# Patient Record
Sex: FEMALE | Race: WHITE | HISPANIC OR LATINO | Employment: FULL TIME | URBAN - METROPOLITAN AREA
[De-identification: names, ages, dates, MRNs, and addresses within clinical notes are randomized per-mention and may not be internally consistent; named-entity substitution may affect disease eponyms.]

---

## 2017-09-08 ENCOUNTER — TELEPHONE (OUTPATIENT)
Dept: GASTROENTEROLOGY | Facility: CLINIC | Age: 42
End: 2017-09-08

## 2017-09-27 ENCOUNTER — TELEPHONE (OUTPATIENT)
Dept: GASTROENTEROLOGY | Facility: CLINIC | Age: 42
End: 2017-09-27

## 2017-09-27 NOTE — TELEPHONE ENCOUNTER
Reminder call placed regarding 5 year follow up colonoscopy recommended by Dr. Burden.     Jaycee Escamilla RN

## 2017-09-27 NOTE — TELEPHONE ENCOUNTER
Per recommendations, patient is due for 5 year follow up colonoscopy     Repeat colonoscopy in 5 years for surveillance to                             evaluate for interval polyposis, particularly if                             there is a strong concern for Jimenes syndrome in                             light of patient's rectus muscle fibromatosis.    Please place order for scheduling    Jaycee Escamilla RN

## 2018-08-06 ENCOUNTER — RADIANT APPOINTMENT (OUTPATIENT)
Dept: MAMMOGRAPHY | Facility: CLINIC | Age: 43
End: 2018-08-06
Attending: OBSTETRICS & GYNECOLOGY
Payer: COMMERCIAL

## 2018-08-06 DIAGNOSIS — Z12.31 VISIT FOR SCREENING MAMMOGRAM: ICD-10-CM

## 2018-11-19 ENCOUNTER — RADIANT APPOINTMENT (OUTPATIENT)
Dept: GENERAL RADIOLOGY | Facility: CLINIC | Age: 43
End: 2018-11-19
Attending: FAMILY MEDICINE
Payer: COMMERCIAL

## 2018-11-19 ENCOUNTER — OFFICE VISIT (OUTPATIENT)
Dept: ORTHOPEDICS | Facility: CLINIC | Age: 43
End: 2018-11-19
Payer: COMMERCIAL

## 2018-11-19 VITALS
DIASTOLIC BLOOD PRESSURE: 79 MMHG | SYSTOLIC BLOOD PRESSURE: 119 MMHG | HEIGHT: 62 IN | HEART RATE: 80 BPM | BODY MASS INDEX: 26.68 KG/M2 | WEIGHT: 145 LBS

## 2018-11-19 DIAGNOSIS — M79.642 LEFT HAND PAIN: ICD-10-CM

## 2018-11-19 DIAGNOSIS — M79.642 LEFT HAND PAIN: Primary | ICD-10-CM

## 2018-11-19 NOTE — PATIENT INSTRUCTIONS
Dionte Ortega MD  [facility]  [facility]  [address] [place] [address]    Phone: [phone]  Fax: [phone]

## 2018-11-19 NOTE — MR AVS SNAPSHOT
"              After Visit Summary   11/19/2018    Erum Díaz    MRN: [id number]           Patient Information     Date Of Birth          1975        Visit Information        Provider Department      11/19/2018 4:50 PM Dionte Ortega MD [facility]        Today's Diagnoses     Left hand pain    -  1      Care Instructions    Dionte Ortega MD  [facility] Clinic  [facility]  [address] [place] [address]    Phone: [phone]  Fax: [phone]                   Follow-ups after your visit        Your next 10 appointments already scheduled     Dec 05, 2018  4:20 PM CST   (Arrive by 4:05 PM)   New Patient Visit with Zee Basilio MD   [facility] ([facility])    [address] [place] [address]  [address] [facility] [address] [phone]              Who to contact     Please call your clinic at [phone] to:    Ask questions about your health    Make or cancel appointments    Discuss your medicines    Learn about your test results    Speak to your doctor            Additional Information About Your Visit        Care EveryWhere ID     This is your Care EveryWhere ID. This could be used by other organizations to access your [place] medical records  [id number]        Your Vitals Were     Pulse Height BMI (Body Mass Index)             80 1.575 m (5' 2\") 26.52 kg/m2          Blood Pressure from Last 3 Encounters:   11/19/18 119/79   03/01/16 122/70   09/09/15 109/83    Weight from Last 3 Encounters:   11/19/18 65.8 kg (145 lb)   03/01/16 66.1 kg (145 lb 11.6 oz)   09/09/15 68 kg (150 lb)               Primary Care Provider Office Phone # Fax #    Bridgette Walker [phone]       [address] [address]        Equal Access to Services     YNES SALAZAR AH: Hadii aj Bartlett, waaxda luqadaha, qaybta " agus vargas josephmarly ronel mueller ah. So [facility] [phone].    ATENCIÓN: Si channing arias, tiene a thompson disposición servicios gratuitos de asistencia lingüística. Harshil al [phone].    We comply with applicable federal civil rights laws and [place] laws. We do not discriminate on the basis of race, color, national origin, age, disability, sex, sexual orientation, or gender identity.            Thank you!     Thank you for choosing [facility]  for your care. Our goal is always to provide you with excellent care. Hearing back from our patients is one way we can continue to improve our services. Please take a few minutes to complete the written survey that you may receive in the mail after your visit with us. Thank you!             Your Updated Medication List - Protect others around you: Learn how to safely use, store and throw away your medicines at www.disposemymeds.org.          This list is accurate as of 11/19/18  6:21 PM.  Always use your most recent med list.                   Brand Name Dispense Instructions for use Diagnosis    ALBUTEROL SULFATE HFA IN      Inhale  into the lungs as needed.        calcium + D 600-200 MG-UNIT Tabs   Generic drug:  calcium carbonate-vitamin D      Take 2 tablets by mouth daily.        CIPROFLOXACIN PO      Take 500 mg by mouth 2 times daily        FEXOFENADINE HCL PO      Take  by mouth as needed.        ibuprofen 200 MG tablet    ADVIL/MOTRIN     Take 200 mg by mouth every 4 hours as needed. Takes 2 tablets PRN        MULTI FOR HER Tabs      Take 1 tablet by mouth daily.        NASONEX 50 MCG/ACT spray   Generic drug:  mometasone      Spray 2 sprays into both nostrils daily.        oxyCODONE-acetaminophen 5-325 MG per tablet    PERCOCET    10 tablet    Take 1-2 tablets by mouth every 4 hours as needed for pain (moderate to severe)    Uterine cramping       PROZAC PO           tolterodine 4 MG 24 hr capsule     DETROL LA    31 capsule    Take 1 capsule (4 mg) by mouth daily    Urinary urgency, Urge incontinence

## 2018-11-20 LAB — RADIOLOGIST FLAGS: NORMAL

## 2018-11-24 ENCOUNTER — DOCUMENTATION ONLY (OUTPATIENT)
Dept: ORTHOPEDICS | Facility: CLINIC | Age: 43
End: 2018-11-24

## 2018-11-24 NOTE — PROGRESS NOTES
Erum was fitted for a left thumb spica exos brace. Brace was fitted properly and patient verbalized that exos fit well. Patient was taught proper care and use of the brace. All her questions were answered. She verbalized understanding of plan.  Teresa Rasheed ATC

## 2018-12-03 ENCOUNTER — HEALTH MAINTENANCE LETTER (OUTPATIENT)
Age: 43
End: 2018-12-03

## 2018-12-03 NOTE — TELEPHONE ENCOUNTER
FUTURE VISIT INFORMATION      FUTURE VISIT INFORMATION:    Date: 12/5/18    Time:     [place] [facility] [place] INFORMATION:    Referring provider:  self    Referring providers clinic:      Reason for visit/diagnosis  L Hip and Back Pain. Previous patient of Dr. Basilio. Last visit 3/18/2015.  2 week f/u Palm of left hand, slipped and fell on wet floor. 11/10/2018. Was seen in [facility] on 11/19/18.  Appointment per Patient.    RECORDS REQUESTED FROM:       Clinic name Comments Records Status Imaging Status     internal internal

## 2018-12-05 ENCOUNTER — PRE VISIT (OUTPATIENT)
Dept: ORTHOPEDICS | Facility: CLINIC | Age: 43
End: 2018-12-05

## 2018-12-05 ENCOUNTER — RADIANT APPOINTMENT (OUTPATIENT)
Dept: GENERAL RADIOLOGY | Facility: CLINIC | Age: 43
End: 2018-12-05
Payer: COMMERCIAL

## 2018-12-05 ENCOUNTER — OFFICE VISIT (OUTPATIENT)
Dept: ORTHOPEDICS | Facility: CLINIC | Age: 43
End: 2018-12-05
Payer: COMMERCIAL

## 2018-12-05 VITALS — RESPIRATION RATE: 16 BRPM | HEIGHT: 62 IN | BODY MASS INDEX: 26.68 KG/M2 | WEIGHT: 145 LBS

## 2018-12-05 DIAGNOSIS — M25.532 LEFT WRIST PAIN: Primary | ICD-10-CM

## 2018-12-05 DIAGNOSIS — M25.532 LEFT WRIST PAIN: ICD-10-CM

## 2018-12-05 DIAGNOSIS — S73.192D TEAR OF LEFT ACETABULAR LABRUM, SUBSEQUENT ENCOUNTER: ICD-10-CM

## 2018-12-05 NOTE — MR AVS SNAPSHOT
After Visit Summary   12/5/2018    Erum Díaz    MRN: [id number]           Patient Information     Date Of Birth          1975        Visit Information        Provider Department      12/5/2018 4:20 PM Zee Basilio MD [facility] Medicine        Today's Diagnoses     Left wrist pain    -  1    Tear of left acetabular labrum, subsequent encounter           Follow-ups after your visit        Additional Services     HAND THERAPY Occupational Therapy or Physical Therapy       Hand Therapy Referral: initial scaphoid tenderness, exos for 3 weeks            PHYSICAL THERAPY REFERRAL (Internal)       Physical Therapy Referral                  Follow-up notes from your care team     Return in about 6 weeks (around 1/16/2019), or if symptoms worsen or fail to improve.      Your next 10 appointments already scheduled     Dec 07, 2018  8:30 AM CST   (Arrive by 8:15 AM)   CATHY Hand with Yvonne Hall OT   [facility] ([facility])    [address] [place] [address]   [phone]            Dec 14, 2018  8:30 AM CST   CATHY Hand with Judi Barfield OT   [facility] ([facility])    [address] [place] [address]   [phone]            Dec 21, 2018  8:30 AM CST   CATHY Hand with Kylie Aldana OT   [facility] ([facility])    [address] [place] [address]   [phone]            Dec 28, 2018  8:30 AM CST   CATHY Hand with Judi Barfield OT   [facility] ([facility])    [address] [place] [address]   [phone]            Jan 04, 2019  8:30 AM CST   CATHY Hand with Kylie Aldana OT   [facility] ([facility])    [address] [place] [address] [phone]            Jan  "04, 2019  9:00 AM CST   (Arrive by 8:45 AM)   CATHY Extremity with Lawson Connell PT   [facility] CATHY ([facility] [place])    [address] [address] [phone]              Future tests that were ordered for you today     Open Future Orders        Priority Expected Expires Ordered    PHYSICAL THERAPY REFERRAL (Internal) Routine  12/5/2019 12/5/2018    HAND THERAPY Occupational Therapy or Physical Therapy Routine  12/5/2019 12/5/2018            Who to contact     Please call your clinic at [phone] to:    Ask questions about your health    Make or cancel appointments    Discuss your medicines    Learn about your test results    Speak to your doctor            Additional Information About Your Visit        [employer] Information     [employer] gives you secure access to your electronic health record. If you see a primary care provider, you can also send messages to your care team and make appointments. If you have questions, please call your primary care clinic.  If you do not have a primary care provider, please call [phone] and they will assist you.      [employer] is an electronic gateway that provides easy, online access to your medical records. With [employer], you can request a clinic appointment, read your test results, renew a prescription or communicate with your care team.     To access your existing account, please contact your [facility] or call [phone] for assistance.        Care EveryWhere ID     This is your Care EveryWhere ID. This could be used by other organizations to access your [place] medical records  [id number]        Your Vitals Were     Respirations Height BMI (Body Mass Index)             16 5' 2\" (1.575 m) 26.52 kg/m2          Blood Pressure from Last 3 Encounters:   11/19/18 119/79   03/01/16 122/70   09/09/15 109/83    Weight from Last 3 Encounters:   12/05/18 145 lb (65.8 " kg)   11/19/18 145 lb (65.8 kg)   03/01/16 145 lb 11.6 oz (66.1 kg)               Primary Care Provider Office Phone # Fax #    Bridgette Walker [phone]       [address] [address]        Equal Access to Services     YNES SALAZAR : Hadii aad ku hadasho Soomaali, waaxda luqadaha, qaybta kaalmada adeegyada, agus singletaryin hayerumn ronel nuñezsophyrobb mueller . So [facility] [phone].    ATENCIÓN: Si habla español, tiene a thompson disposición servicios gratuitos de asistencia lingüística. Linaame al [phone].    We comply with applicable federal civil rights laws and [place] laws. We do not discriminate on the basis of race, color, national origin, age, disability, sex, sexual orientation, or gender identity.            Thank you!     Thank you for choosing [facility]  for your care. Our goal is always to provide you with excellent care. Hearing back from our patients is one way we can continue to improve our services. Please take a few minutes to complete the written survey that you may receive in the mail after your visit with us. Thank you!             Your Updated Medication List - Protect others around you: Learn how to safely use, store and throw away your medicines at www.disposemymeds.org.          This list is accurate as of 12/5/18 11:59 PM.  Always use your most recent med list.                   Brand Name Dispense Instructions for use Diagnosis    ALBUTEROL SULFATE HFA IN      Inhale  into the lungs as needed.        calcium + D 600-200 MG-UNIT Tabs   Generic drug:  calcium carbonate-vitamin D      Take 2 tablets by mouth daily.        CIPROFLOXACIN PO      Take 500 mg by mouth 2 times daily        FEXOFENADINE HCL PO      Take  by mouth as needed.        ibuprofen 200 MG tablet    ADVIL/MOTRIN     Take 200 mg by mouth every 4 hours as needed. Takes 2 tablets PRN        MULTI FOR HER Tabs      Take 1 tablet by mouth daily.        NASONEX 50  MCG/ACT nasal spray   Generic drug:  mometasone      Spray 2 sprays into both nostrils daily.        oxyCODONE-acetaminophen 5-325 MG tablet    PERCOCET    10 tablet    Take 1-2 tablets by mouth every 4 hours as needed for pain (moderate to severe)    Uterine cramping       PROZAC PO           tolterodine ER 4 MG 24 hr capsule    DETROL LA    31 capsule    Take 1 capsule (4 mg) by mouth daily    Urinary urgency, Urge incontinence

## 2018-12-05 NOTE — LETTER
December 5, 2018    RE:Erum Díaz  [address] [facility] [facility] [address]    1975            Dear Ms. Díaz      To Whom It May Concern:    Erum Díaz is under my professional care for    Left wrist pain  Tear of left acetabular labrum, subsequent encounter.   She  may return to work on or about 12/6/18 with the following: Please allow patient to attend hand therapy and physical therapy.  She will require time to attend these appointments.        Sincerely,      Zee Basilio MD

## 2018-12-05 NOTE — PROGRESS NOTES
" Subjective:   Erum Díaz is a 43 year old female who presents with left wrist pain. She states that she had a FOOSH injury. She also wants to f/u with her labral tear in her left hip. 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 She states that it is painful to take off exos.    Left hip intermittent pain at hip flexors and glut muscles. Gradually worsening over past month. Asymptomatic today. Hasn't been doing PT exercises or stretches recently, but usually reports improvement in sx when she does these. Previous right hip MRI demonstrated labral tear, but denies any groin pain or mechanical sx.     Left Wrist improving gradually. Has been wearing exos except for few breaks. Wrist motion improving, only discomfort now with opposition. No swelling.     Background:   Date of injury: 11/10/18  Duration of symptoms: 3-4 weeks  Mechanism of Injury: Acute; Fall  Aggravating factors: When she was working at her job  Relieving Factors: Immobilization   Prior Evaluation: Prior Physician Evalutation: Dr. Ortega, xrays    PAST MEDICAL, SOCIAL, SURGICAL AND FAMILY HISTORY: Past medical, surgical, family and social history was reviewed and unchanged since last visit.  ALLERGIES: She is allergic to hydrocodone and minocycline.    CURRENT MEDICATIONS: She has a current medication list which includes the following prescription(s): albuterol sulfate, calcium + d, ciprofloxacin, fexofenadine hcl, fluoxetine hcl, ibuprofen, mometasone, multi for her, oxycodone-acetaminophen, and tolterodine er.     REVIEW OF SYSTEMS: 10 point review of systems is negative except as noted above.     Exam:   Resp 16  Ht 5' 2\" (1.575 m)  Wt 145 lb (65.8 kg)  BMI 26.52 kg/m2           CONSTITUTIONAL: healthy, alert and no distress  HEAD: Normocephalic. No masses, lesions, tenderness or abnormalities  SKIN: no suspicious lesions or rashes  GAIT: " normal  NEUROLOGIC: Non-focal  PSYCHIATRIC: affect normal/bright and mentation appears normal.    MUSCULOSKELETAL:   Left hip  Palpation: Tender:   left gluteus medius  Non-tender:  right greater trochanter, right gluteus medius, right ASIS  Range of Motion:  Full ROM, both hips. No groin pain  Strength:  full strength  Special tests: adequate hip abductor strength    Left wrist  No obvious swelling or deformity  Tender to palpation over thenar area and scaphoid tubercle  Nontender over anatomic snuffbox, distal radius, ulnar styloid  No pain with passive wrist extension, flexion, ulnar/radial deviation, or supination/pronation  Mild pain with resisted flexion.  Thumb opposition and  strength 5/5       Assessment/Plan:   Left wrist pain: Repeat wrist xrays with scaphoid view today reassuring, no fracture or osseous changes visualized now 3 weeks after injury. Recommend wearing brace for comfort with daily activities, and begin working on range of motion with hand therapy. Follow-up one month if not improving, sooner as needed.   -     HAND THERAPY Occupational Therapy or Physical Therapy; Future    Tear of left acetabular labrum, subsequent encounter: Known labral tear, symptoms intermittent and improved previously with formal physical therapy. We discussed the option of repeating imaging, but would be unlikely to . Patient agreeable to returning to physical therapy. Follow up as needed if not improving. Consider US guided hip injection  -     PHYSICAL THERAPY REFERRAL (Internal); Future    RTC 6 weeks  X-RAY INTERPRETATION:   X-Ray of the Left Wrist: 3-view, ap, lateral, oblique  ordered and interpreted in the office today was negative for fracture, subluxation or joint space abnormality.

## 2018-12-05 NOTE — LETTER
"  12/5/2018      RE: Erum Díaz  [address] [facility] [address]        Subjective:   Erum Díaz is a 43 year old female who presents with left wrist pain. She states that she had a FOOSH injury. She also wants to f/u with her labral tear in her left hip. 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 She states that it is painful to take off exos.    Left hip intermittent pain at hip flexors and glut muscles. Gradually worsening over past month. Asymptomatic today. Hasn't been doing PT exercises or stretches recently, but usually reports improvement in sx when she does these. Previous right hip MRI demonstrated labral tear, but denies any groin pain or mechanical sx.     Left Wrist improving gradually. Has been wearing exos except for few breaks. Wrist motion improving, only discomfort now with opposition. No swelling.     Background:   Date of injury: 11/10/18  Duration of symptoms: 3-4 weeks  Mechanism of Injury: Acute; Fall  Aggravating factors: When she was working at her job  Relieving Factors: Immobilization   Prior Evaluation: Prior Physician Evalutation: Dr. Ortega, xrays    PAST MEDICAL, SOCIAL, SURGICAL AND FAMILY HISTORY: Past medical, surgical, family and social history was reviewed and unchanged since last visit.  ALLERGIES: She is allergic to hydrocodone and minocycline.    CURRENT MEDICATIONS: She has a current medication list which includes the following prescription(s): albuterol sulfate, calcium + d, ciprofloxacin, fexofenadine hcl, fluoxetine hcl, ibuprofen, mometasone, multi for her, oxycodone-acetaminophen, and tolterodine er.     REVIEW OF SYSTEMS: 10 point review of systems is negative except as noted above.     Exam:   Resp 16  Ht 5' 2\" (1.575 m)  Wt 145 lb (65.8 kg)  BMI 26.52 kg/m2           CONSTITUTIONAL: healthy, alert and no distress  HEAD: Normocephalic. No " masses, lesions, tenderness or abnormalities  SKIN: no suspicious lesions or rashes  GAIT: normal  NEUROLOGIC: Non-focal  PSYCHIATRIC: affect normal/bright and mentation appears normal.    MUSCULOSKELETAL:   Left hip  Palpation: Tender:   left gluteus medius  Non-tender:  right greater trochanter, right gluteus medius, right ASIS  Range of Motion:  Full ROM, both hips. No groin pain  Strength:  full strength  Special tests: adequate hip abductor strength    Left wrist  No obvious swelling or deformity  Tender to palpation over thenar area and scaphoid tubercle  Nontender over anatomic snuffbox, distal radius, ulnar styloid  No pain with passive wrist extension, flexion, ulnar/radial deviation, or supination/pronation  Mild pain with resisted flexion.  Thumb opposition and  strength 5/5       Assessment/Plan:   Left wrist pain: Repeat wrist xrays with scaphoid view today reassuring, no fracture or osseous changes visualized now 3 weeks after injury. Recommend wearing brace for comfort with daily activities, and begin working on range of motion with hand therapy. Follow-up one month if not improving, sooner as needed.   -     HAND THERAPY Occupational Therapy or Physical Therapy; Future    Tear of left acetabular labrum, subsequent encounter: Known labral tear, symptoms intermittent and improved previously with formal physical therapy. We discussed the option of repeating imaging, but would be unlikely to . Patient agreeable to returning to physical therapy. Follow up as needed if not improving. Consider US guided hip injection  -     PHYSICAL THERAPY REFERRAL (Internal); Future    RTC 6 weeks  X-RAY INTERPRETATION:   X-Ray of the Left Wrist: 3-view, ap, lateral, oblique  ordered and interpreted in the office today was negative for fracture, subluxation or joint space abnormality.    Zee Basilio MD

## 2018-12-07 ENCOUNTER — THERAPY VISIT (OUTPATIENT)
Dept: OCCUPATIONAL THERAPY | Facility: CLINIC | Age: 43
End: 2018-12-07
Payer: COMMERCIAL

## 2018-12-07 DIAGNOSIS — M25.532 LEFT WRIST PAIN: ICD-10-CM

## 2018-12-07 PROCEDURE — 97165 OT EVAL LOW COMPLEX 30 MIN: CPT | Mod: GO | Performed by: OCCUPATIONAL THERAPIST

## 2018-12-07 PROCEDURE — 97110 THERAPEUTIC EXERCISES: CPT | Mod: GO | Performed by: OCCUPATIONAL THERAPIST

## 2018-12-07 PROCEDURE — 97760 ORTHOTIC MGMT&TRAING 1ST ENC: CPT | Mod: GO | Performed by: OCCUPATIONAL THERAPIST

## 2018-12-07 NOTE — MR AVS SNAPSHOT
After Visit Summary   12/7/2018    Erum Díaz    MRN: [id number]           Patient Information     Date Of Birth          1975        Visit Information        Provider Department      12/7/2018 8:30 AM Yvonne Hall OT  [facility]        Today's Diagnoses     Left wrist pain           Follow-ups after your visit        Your next 10 appointments already scheduled     Dec 14, 2018  8:30 AM CST   CATHY Hand with Judi Barfield OT    [facility] ([facility])    [address] [place] [id number] [phone]            Dec 21, 2018  8:30 AM CST   CATHY Hand with Kylie Aldana OT    [facility] ([facility])    [address] [place] [address] [id number] [phone]            Dec 28, 2018  8:30 AM CST   CATHY Hand with Judi Barfield OT    [facility] ([facility])    [address] [place] [address] [id number] [phone]            Jan 04, 2019  8:30 AM CST   CATHY Hand with ASHLEY [facility] ([facility])    [address] [place] [address] [id number] [phone]            Jan 04, 2019  9:00 AM CST   (Arrive by 8:45 AM)   CATHY Extremity with Lawson Connell PT   [facility] CATHY ([facility])    [address] [place] [address] [id number] [phone]              Who to contact     If you have questions or need follow up information about today's clinic visit or your schedule please contact [facility] directly at [phone].  Normal or non-critical lab and imaging results will be communicated to you by MyChart, letter or phone within 4 business days after the clinic has received the results. If you do not hear from us within 7 days, please contact the clinic through [employer] or  phone. If you have a critical or abnormal lab result, we will notify you by phone as soon as possible.  Submit refill requests through [employer] or call your pharmacy and they will forward the refill request to us. Please allow 3 business days for your refill to be completed.          Additional Information About Your Visit        [employer] Information     [employer] gives you secure access to your electronic health record. If you see a primary care provider, you can also send messages to your care team and make appointments. If you have questions, please call your primary care clinic.  If you do not have a primary care provider, please call [phone] and they will assist you.        Care EveryWhere ID     This is your Care EveryWhere ID. This could be used by other organizations to access your [place] medical records  [id number]         Blood Pressure from Last 3 Encounters:   11/19/18 119/79   03/01/16 122/70   09/09/15 109/83    Weight from Last 3 Encounters:   12/05/18 65.8 kg (145 lb)   11/19/18 65.8 kg (145 lb)   03/01/16 66.1 kg (145 lb 11.6 oz)              We Performed the Following     HAND THERAPY Occupational Therapy or Physical Therapy     HC OT EVAL, LOW COMPLEXITY     CATHY INITIAL EVAL REPORT     ORTHOTIC MGMT AND TRAINING, EACH 15 MIN     THERAPEUTIC EXERCISES        Primary Care Provider Office Phone # Fax #    Bridgette Walker [phone]       [place] [address]        Equal Access to Services     YNES SALAZAR AH: Hadii aad ku hadasho Soomaali, waaxda luqadaha, qaybta kaalmada adeegyada, agus dean. So [facility] [phone].    ATENCIÓN: Si habla español, tiene a thompson disposición servicios gratuitos de asistencia lingüística. Llame al [phone].    We comply with applicable federal civil rights laws and [place] laws. We do not discriminate on the basis of race, color, national origin, age, disability, sex, sexual  orientation, or gender identity.            Thank you!     Thank you for choosing [facility]  for your care. Our goal is always to provide you with excellent care. Hearing back from our patients is one way we can continue to improve our services. Please take a few minutes to complete the written survey that you may receive in the mail after your visit with us. Thank you!             Your Updated Medication List - Protect others around you: Learn how to safely use, store and throw away your medicines at www.disposemymeds.org.          This list is accurate as of 12/7/18 10:01 AM.  Always use your most recent med list.                   Brand Name Dispense Instructions for use Diagnosis    ALBUTEROL SULFATE HFA IN      Inhale  into the lungs as needed.        calcium + D 600-200 MG-UNIT Tabs   Generic drug:  calcium carbonate-vitamin D      Take 2 tablets by mouth daily.        CIPROFLOXACIN PO      Take 500 mg by mouth 2 times daily        FEXOFENADINE HCL PO      Take  by mouth as needed.        ibuprofen 200 MG tablet    ADVIL/MOTRIN     Take 200 mg by mouth every 4 hours as needed. Takes 2 tablets PRN        MULTI FOR HER Tabs      Take 1 tablet by mouth daily.        NASONEX 50 MCG/ACT nasal spray   Generic drug:  mometasone      Spray 2 sprays into both nostrils daily.        oxyCODONE-acetaminophen 5-325 MG tablet    PERCOCET    10 tablet    Take 1-2 tablets by mouth every 4 hours as needed for pain (moderate to severe)    Uterine cramping       PROZAC PO           tolterodine ER 4 MG 24 hr capsule    DETROL LA    31 capsule    Take 1 capsule (4 mg) by mouth daily    Urinary urgency, Urge incontinence

## 2018-12-07 NOTE — PROGRESS NOTES
"Hand Therapy Initial Evaluation    Current Date:  12/7/2018    Diagnosis: L wrist pain  DOI: 11/10/18    Subjective:  Erum Díaz is a 43 year old right hand dominant female.    Patient reports symptoms of pain, stiffness/loss of motion and weakness/loss of strength of the left wrist which occurred due to FOOSH sideways while walking out of restaurant. Since onset symptoms are Unchanged  Special tests:  x-ray see note below.  Previous treatment: exos splint .    General health as reported by patient is excellent.  Pertinent medical history includes:Incontinence, Overweight  Medical allergies:none.  Surgical history: none.  Medication history: Pain, Steroids.    Per MD note, \"Repeat wrist xrays with scaphoid view today reassuring, no fracture or osseous changes visualized now 3 weeks after injury\"    Occupational Profile Information:  Current occupation is veterinary dermatologist - professor at the   Currently working in normal job without restrictions  Job Tasks: Computer Work, Driving, Lifting, Carrying, Repetitive Tasks  Prior functional level:  no limitations  Barriers include:none  Mobility: No difficulty  Transportation: drives  Leisure activities/hobbies: hiking, reading, family time    Functional Outcome Measure:  Upper Extremity Functional Index Score:  SCORE:   Column Totals: /80: 41   (A lower score indicates greater disability.)    Objective:  Sensation: WNL per patient report    Pain Level Report: On scale 0-10/10  Date 12/7/2018    Side L    Overall 5    At Rest 0-1    With Activity 7-8      Primary Report: location and description  Date 12/7/2018    Side L    Location Volar thumb, thenar eminence, snuffbox, 1st DC    Radiation no    Pain Quality Sharp, strong    Frequency intermittent    Duration Motion dependent    Exacerbated by  Lifting, gripping, twisting, pinching, pulling, opposition     Relieved by Rest, brace    Progression since onset unchanged      Range of Motion Wrist AROM (PROM):  Date " 12/7/2018 12/7/2018   Side R L   Ext 63 63   Flex 78 65   UD 28 25   RD 15 10   Sup 85 85   Pro 85 85     Thumb Range of Motion:  AROM (PROM)  Date 12/7/2018 12/7/2018   Side: R L   MCP ext 0 0   MCP flex 52 62   IP ext 0 0   IP flex 60 50   RABD 43 35   PABD 40 32   Opposition - Kapandji 10/10 6/10 (pain 5/10)     Tenderness: Pain level report on scale 0-10/10  Date 12/7/2018    Side L    Radial Styloid 2    DRSN 0    PIN 0      Special Tests: Pain level report on scale 0-10/10  Date 12/7/2018    Side L    Finkelstein 2    Tinels at DRSN 0      Resisted Testing: MMT on scale 0-5/5, Pain level report on scale 0-10/10  Date 12/7/2018    Side L    Thumb Abduction 0    Thumb Extension 3    Radial Deviation 2    Ulnar Deviation 0    Wrist Ext 0    Wrist Flex 0      STRENGTH: (Measured in pounds, pain scale 0-10/10)    Date 12/7/2018        Trials Left Right Left Right Left Right Left Right Left Right Left Right   1 5 35             2               3               Avg               Pain 1                3 Point Pinch  Date 12/7/2018        Trials Left Right Left Right Left Right Left Right Left Right Left Right   1 3 12             2               3               Avg               Pain 3                Lateral Pinch  Date 12/7/2018        Trials Left Right Left Right Left Right Left Right Left Right Left Right   1 8 13             2               3               Avg               Pain 2                Assessment:  Patient presents with symptoms consistent with diagnosis of left wrist pain, with conservative intervention.     Patient's limitations or Problem List includes:  Pain, Decreased ROM/motion, Weakness, Decreased  and pinch strength and tightness in musculature of the left wrist, hand and thumb which interferes with the patient's ability to perform Self Care Tasks (dressing, bathing, hygiene/toileting), Work Tasks, Recreational Activities and Household Chores as compared to previous level of  function.    Rehab Potential:  Excellent - Return to full activity, no limitations    Patient will benefit from skilled Occupational Therapy to increase wrist and thumb ROM, flexibility,  strength and pinch strength and decrease pain to return to previous activity level and resume normal daily tasks and to reach their rehab potential.    Barriers to Learning:  No barrier    Communication Issues:  Patient appears to be able to clearly communicate and understand verbal and written communication and follow directions correctly.    Chart Review: Brief history including review of medical and/or therapy records relating to the presenting problem and Simple history review with patient    Assessment of Occupational Performance:  5 or more Performance Deficits  Identified Performance Deficits: bathing/showering, feeding, hygiene and grooming, home establishment and management, meal preparation and cleanup, shopping, work and leisure activities      Clinical Decision Making (Complexity): Low complexity    Treatment Explanation:  The following has been discussed with the patient:    RX ordered/plan of care  Anticipated outcomes  Possible risks and side effects    P: Frequency:  1 X week, once daily  Duration:  for 8 weeks    Treatment Plan:    Modalities:    US and Fluidotherapy   Therapeutic Exercise:   AROM of wrist and thumb  PROM with stretch to wrist and thumb extensors   Wrist isotonics and isometrics as appropriate  Manual Techniques:   Re-alignment of the wrist in relation to radius (glide radially)  Interosseous membrane glide  Friction massage over 1st dorsal compartment  Myofascial release of the thumb extensors and flexors  Passive/Active radial nerve glides  Orthosis:    Forearm based thumb spica orthosis    Home Program:   Exercise:   AROM of wrist and thumb, PROM with stretch into simultaneous thumb flexion and ulnar deviation, pain free  Orthosis:   Static orthosis and Forearm based orthosis   Thumb spica  orthosis during the day and at night if needed. Will remove for exercise and hygiene.  Activity:   Avoid activities that exacerbate pain in the wrist or thumb.    Avoid  with twist, wide grasp.    Discharge Plan:    Achieve all LTG.  Independent in home treatment program.  Reach maximal therapeutic benefit.    Next Visit:  Check splint  MFR  US?  Radial nerve gliding?

## 2018-12-14 ENCOUNTER — THERAPY VISIT (OUTPATIENT)
Dept: OCCUPATIONAL THERAPY | Facility: CLINIC | Age: 43
End: 2018-12-14
Payer: COMMERCIAL

## 2018-12-14 DIAGNOSIS — M25.532 LEFT WRIST PAIN: ICD-10-CM

## 2018-12-14 PROCEDURE — 97112 NEUROMUSCULAR REEDUCATION: CPT | Mod: GO | Performed by: OCCUPATIONAL THERAPIST

## 2018-12-14 PROCEDURE — 97110 THERAPEUTIC EXERCISES: CPT | Mod: GO | Performed by: OCCUPATIONAL THERAPIST

## 2018-12-14 PROCEDURE — 97140 MANUAL THERAPY 1/> REGIONS: CPT | Mod: GO | Performed by: OCCUPATIONAL THERAPIST

## 2018-12-21 ENCOUNTER — THERAPY VISIT (OUTPATIENT)
Dept: OCCUPATIONAL THERAPY | Facility: CLINIC | Age: 43
End: 2018-12-21
Payer: COMMERCIAL

## 2018-12-21 DIAGNOSIS — M25.532 LEFT WRIST PAIN: ICD-10-CM

## 2018-12-21 PROCEDURE — 97763 ORTHC/PROSTC MGMT SBSQ ENC: CPT | Mod: GO | Performed by: OCCUPATIONAL THERAPIST

## 2019-01-04 ENCOUNTER — THERAPY VISIT (OUTPATIENT)
Dept: PHYSICAL THERAPY | Facility: CLINIC | Age: 44
End: 2019-01-04
Payer: COMMERCIAL

## 2019-01-04 ENCOUNTER — THERAPY VISIT (OUTPATIENT)
Dept: OCCUPATIONAL THERAPY | Facility: CLINIC | Age: 44
End: 2019-01-04
Payer: COMMERCIAL

## 2019-01-04 DIAGNOSIS — S73.192D TEAR OF LEFT ACETABULAR LABRUM, SUBSEQUENT ENCOUNTER: Primary | ICD-10-CM

## 2019-01-04 DIAGNOSIS — M25.532 LEFT WRIST PAIN: Primary | ICD-10-CM

## 2019-01-04 DIAGNOSIS — M25.551 HIP PAIN, RIGHT: ICD-10-CM

## 2019-01-04 DIAGNOSIS — M25.552 HIP PAIN, LEFT: ICD-10-CM

## 2019-01-04 PROCEDURE — 97161 PT EVAL LOW COMPLEX 20 MIN: CPT | Mod: GP | Performed by: PHYSICAL THERAPIST

## 2019-01-04 PROCEDURE — 97110 THERAPEUTIC EXERCISES: CPT | Mod: GP | Performed by: PHYSICAL THERAPIST

## 2019-01-04 PROCEDURE — 97112 NEUROMUSCULAR REEDUCATION: CPT | Mod: GO | Performed by: OCCUPATIONAL THERAPIST

## 2019-01-04 PROCEDURE — 97110 THERAPEUTIC EXERCISES: CPT | Mod: GO | Performed by: OCCUPATIONAL THERAPIST

## 2019-01-04 NOTE — PROGRESS NOTES
SOAP note objective information for 1/4/2019.  Please refer to the daily flowsheet for treatment today, total treatment time and time spent performing 1:1 timed codes.           1/4/2019      CMC exer FDI AROM and with RB at p1, 30/x 3x/day      Gripping Use isoflex hold 10 sec 3/day for 10 reps each        Next Progress CMC stability and wrist stability

## 2019-01-04 NOTE — PROGRESS NOTES
[facility] Initial Evaluation  Subjective:  HPI                  In 2015 the pt fell on her L knee during the Winter. A week or two later she was doing a lot of dancing and she felt a sharp pain in the hip. The felt pain in the front and back of her L hip. She had an X-ray and MRI which revealed a labral tear in the hip. She did PT at that point, but did not do too much follow up. She took NSAIDS and rested. Exercising with step classes seemed to help. Currently, longer walks and hiking seems to be worse than going up and down stairs. The elliptical machine does not hurt at all and seems to help, aerobics, and weight lifting are fine. She feels she is inconsistent with exercise and recently started back up again and noticed some pain in the R hip as well. Now, when she is sleeping she feels some pain in her hips. She typically sleeps on her stomach with her hips in ER/flexion in a frog leg position. The pain seems very similar on the R compared to the L. 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 Getting out of the resting position seems to help the position.     Objective:    Gait:      Deviations:  Hip:  Trendelenberg L               Lumbar/SI Evaluation  ROM:  AROM Lumbar: normal (No reproduction of hip pain with repeated movement testing)                                                          Hip Evaluation  HIP AROM:    Flexion: Left: 115    Right:  115          Internal Rotation: Left: 38    Right: 47  External Rotation: Left: 42    Right: 36      Hip PROM:  Hip PROM:  Left Hip:    Normal  Right Hip:  Normal                          Hip Strength:    Flexion:   Left: 5/5   Pain:  Right: 5/5   Pain:                    Extension:  Left: 4/5  Pain:Right: 4/5    Pain:    Abduction:  Left: 4/5     Pain:Right: 4/5    Pain:                  Hip Special Testing:    Left hip positive for the following special tests:  Raghav;  Fadir/Labrum and Gabriel   Right hip positive for the following special tests:  ThomasRight hip negative for the following special tests:  Raghav or Fadir/Labrum    Hip Palpation:    Left hip tenderness present at:   Greater Trachanter    Right hip tenderness not present at:  Greater Trachanter  Functional Testing:  Functional test hip: No pain with DL squat.                       General     ROS    Assessment/Plan:    Patient is a 43 year old female with both sides hip complaints.    Patient has the following significant findings with corresponding treatment plan.                Diagnosis 1:  Bilateral hip pain  Pain -  hot/cold therapy  Decreased ROM/flexibility - manual therapy and therapeutic exercise  Impaired gait - gait training  Impaired muscle performance - neuro re-education  Decreased function - therapeutic activities  Impaired posture - neuro re-education    Therapy Evaluation Codes:   1) History comprised of:   Personal factors that impact the plan of care:      None.    Comorbidity factors that impact the plan of care are:      Overweight.     Medications impacting care: Anti-inflammatory and None.  2) Examination of Body Systems comprised of:   Body structures and functions that impact the plan of care:      Hip.   Activity limitations that impact the plan of care are:      Walking, sleeping.  3) Clinical presentation characteristics are:   Stable/Uncomplicated.  4) Decision-Making    Low complexity using standardized patient assessment instrument and/or measureable assessment of functional outcome.  Cumulative Therapy Evaluation is: Low complexity.    Previous and current functional limitations:  (See Goal Flow Sheet for this information)    Short term and Long term goals: (See Goal Flow Sheet for this information)     Communication ability:  Patient appears to be able to clearly communicate and understand verbal and written communication and follow directions correctly.  Treatment Explanation - The  following has been discussed with the patient:   RX ordered/plan of care  Anticipated outcomes  Possible risks and side effects  This patient would benefit from PT intervention to resume normal activities.   Rehab potential is excellent.    Frequency:  1 X week, once daily  Duration:  for 12 weeks  Discharge Plan:  Achieve all LTG.  Independent in home treatment program.  Reach maximal therapeutic benefit.    Please refer to the daily flowsheet for treatment today, total treatment time and time spent performing 1:1 timed codes.

## 2019-07-29 PROBLEM — M25.532 LEFT WRIST PAIN: Status: RESOLVED | Noted: 2018-12-07 | Resolved: 2019-07-29

## 2019-09-26 ENCOUNTER — ANCILLARY PROCEDURE (OUTPATIENT)
Dept: MAMMOGRAPHY | Facility: CLINIC | Age: 44
End: 2019-09-26
Attending: OBSTETRICS & GYNECOLOGY
Payer: COMMERCIAL

## 2019-09-26 DIAGNOSIS — Z12.31 VISIT FOR SCREENING MAMMOGRAM: ICD-10-CM

## 2019-11-07 ENCOUNTER — HEALTH MAINTENANCE LETTER (OUTPATIENT)
Age: 44
End: 2019-11-07

## 2019-11-29 ENCOUNTER — TELEPHONE (OUTPATIENT)
Dept: PSYCHIATRY | Facility: CLINIC | Age: 44
End: 2019-11-29

## 2019-11-29 NOTE — TELEPHONE ENCOUNTER
PSYCHIATRY CLINIC PHONE INTAKE     SERVICES REQUESTED / INTERESTED IN          Med Management    Presenting Problem and Brief History                              What would you like to be seen for? (brief description):  Patient is looking to get an assessment and based on recommendations would be open to medication. She has been having a hard time focusing at work and feels the work environment is contributing to her mental health. She works in a very crowded space, has a lot of students/nurses/residents around, and everyone talks at the same time so she can't focus and makes her irritated. Patient is wondering about the possibility of ADD. Work irritation is affecting her interactions with people around her and has started to carry over into her home life.   Have you received a mental health diagnosis? No   Which one (s):   Is there any history of developmental delay?  No   Are you currently seeing a mental health provider?  No            Who / month last seen:  Has seen psychologist in the past  Do you have mental health records elsewhere?  No  Will you sign a release so we can obtain them?  No    Have you ever been hospitalized for psychiatric reasons?  No  Describe:      Do you have current thoughts of self-harm?  No    Do you currently have thoughts of harming others?  No       Substance Use History     Do you have any history of alcohol / illicit drug use?  No  Describe:    Have you ever received treatment for this?  No    Describe:       Social History     Does the patient have a guardian?  No    Name / number:   Have you had an ACT team in last 12 months?  No  Describe:    Do you have any current or past legal issues?  No  Describe:    OK to leave a detailed voicemail?  Yes    Medical/ Surgical History                                   Patient Active Problem List   Diagnosis     Fibromatosis, aggressive     Incomplete bladder emptying     Urgency incontinence     Urinary urgency          Medications              Current Outpatient Medications   Medication Sig Dispense Refill     ALBUTEROL SULFATE HFA IN Inhale  into the lungs as needed.       Calcium Carbonate-Vitamin D (CALCIUM + D) 600-200 MG-UNIT per tablet Take 2 tablets by mouth daily.       CIPROFLOXACIN PO Take 500 mg by mouth 2 times daily       FEXOFENADINE HCL PO Take  by mouth as needed.       FLUoxetine HCl (PROZAC PO)        ibuprofen (ADVIL,MOTRIN) 200 MG tablet Take 200 mg by mouth every 4 hours as needed. Takes 2 tablets PRN       mometasone (NASONEX) 50 MCG/ACT nasal spray Spray 2 sprays into both nostrils daily.       Multiple Vitamins-Minerals (MULTI FOR HER) TABS Take 1 tablet by mouth daily.       oxyCODONE-acetaminophen (PERCOCET) 5-325 MG per tablet Take 1-2 tablets by mouth every 4 hours as needed for pain (moderate to severe) 10 tablet 0     tolterodine (DETROL LA) 4 MG 24 hr capsule Take 1 capsule (4 mg) by mouth daily 31 capsule 6         DISPOSITION      Completed phone screen with patient. Offered JAM with Dr. Carlos but patient would like to get advice from HR and PCP. Patient will call back if interested in scheduling.    Rosana Pires,

## 2020-03-18 ENCOUNTER — OFFICE VISIT (OUTPATIENT)
Dept: DERMATOLOGY | Facility: CLINIC | Age: 45
End: 2020-03-18
Payer: COMMERCIAL

## 2020-03-18 ENCOUNTER — TELEPHONE (OUTPATIENT)
Dept: VASCULAR SURGERY | Facility: CLINIC | Age: 45
End: 2020-03-18

## 2020-03-18 DIAGNOSIS — D48.9 NEOPLASM OF UNCERTAIN BEHAVIOR: Primary | ICD-10-CM

## 2020-03-18 DIAGNOSIS — I83.813 VARICOSE VEINS OF BOTH LOWER EXTREMITIES WITH PAIN: ICD-10-CM

## 2020-03-18 DIAGNOSIS — D22.9 MULTIPLE BENIGN NEVI: ICD-10-CM

## 2020-03-18 ASSESSMENT — PAIN SCALES - GENERAL
PAINLEVEL: NO PAIN (0)
PAINLEVEL: NO PAIN (0)

## 2020-03-18 NOTE — LETTER
3/18/2020       RE: Erum Díaz  [address]  [facility] [address]     Dear Colleague,    Thank you for referring your patient, Erum Díaz, to the [facility] DERMATOLOGY at [facility]. Please see a copy of my visit note below.    Formerly [facility] Dermatology Note      Dermatology Problem List:  # Neoplasm of uncertain behavior, central mid back, bx 3/18/2020     CC:   Chief Complaint   Patient presents with     Skin Check     Spot on back that is now back. Erum would like to do a full body skin check today.         Encounter Date: Mar 18, 2020    History of Present Illness:  Ms. Eurm Díaz is a 44 year old female who presents for evaluation of lesion on back and skin check. First noted spot about 6 weeks ago. It was bloody. Since then it's maybe a little smaller but still there. She also has varicose veins that cause pain when walking and would like a referral. No other painful, bleeding, non-healing, or otherwise symptomatic lesions. Otherwise in usual state of health. No additional skin concerns. No personal or family history of skin cancer.     She had a lot of sun growing up in [place]. No tanning bed use.    Past Medical History:   Patient Active Problem List   Diagnosis     Fibromatosis, aggressive     Incomplete bladder emptying     Urgency incontinence     Urinary urgency     Past Medical History:   Diagnosis Date     Allergy      Asthma     exercise induced     Scoliosis      Past Surgical History:   Procedure Laterality Date     BIOPSY       COLONOSCOPY  1/24/2012    Procedure:COLONOSCOPY; Surgeon:LOREE BEJARANO; Location: GI     DILATION AND CURETTAGE, OPERATIVE HYSTEROSCOPY WITH MORCELLATOR, COMBINED N/A 3/1/2016    Procedure: COMBINED DILATION AND CURETTAGE, OPERATIVE HYSTEROSCOPY WITH MORCELLATOR;  Surgeon: Jade Alcazar MD;  Location:  OR       Social History:  Patient reports that she has never smoked. She has  never used smokeless tobacco. She reports current alcohol use. She reports that she does not use drugs.   Works as a veterinary dermatologist.    Family History:  Family History   Problem Relation Age of Onset     Diabetes Father      Melanoma No family hx of      Skin Cancer No family hx of        Medications:  Current Outpatient Medications   Medication Sig Dispense Refill     ALBUTEROL SULFATE HFA IN Inhale  into the lungs as needed.       Calcium Carbonate-Vitamin D (CALCIUM + D) 600-200 MG-UNIT per tablet Take 2 tablets by mouth daily.       FEXOFENADINE HCL PO Take  by mouth as needed.       ibuprofen (ADVIL,MOTRIN) 200 MG tablet Take 200 mg by mouth every 4 hours as needed. Takes 2 tablets PRN       mometasone (NASONEX) 50 MCG/ACT nasal spray Spray 2 sprays into both nostrils daily.       Multiple Vitamins-Minerals (MULTI FOR HER) TABS Take 1 tablet by mouth daily.       FLUoxetine HCl (PROZAC PO)        Allergies   Allergen Reactions     Hydrocodone Nausea and Vomiting     Sedated, dizzy     Minocycline      Dizziness         Review of Systems:  -Constitutional: Otherwise feeling well today, in usual state of health.  -Skin: As above in HPI. No additional skin concerns.    Physical exam:  GEN: This is a well developed, well-nourished female in no acute distress, in a pleasant mood.    SKIN: Full skin, which includes the head/face, both arms, chest, back, abdomen,both legs, genitalia and/or groin buttocks, digits and/or nails, was examined.  -Graff skin type: III  -Central mid back with 5 mm slightly pearly papule. Dermoscopy some flecks of pigment, small vessels.  -Multiple regular brown pigmented macules and papules are identified on the trunk and extremities.   -No other lesions of concern on areas examined.     Impression/Plan:  1. Neoplasm of uncertain behavior, central mid back. Ddx ISK, nevus, BCC.  - Shave biopsy:  After discussion of benefits and risks including but not limited to  bleeding/bruising, pain/swelling, infection, scar, incomplete removal, nerve damage/numbness, recurrence, and non-diagnostic biopsy, written consent, verbal consent and photographs were obtained. Time-out was performed. The area was cleaned with isopropyl alcohol. 0.5ml of 1% lidocaine with 1:100,000 epinephrine was injected to obtain adequate anesthesia. A shave biopsy was performed. Hemostasis was achieved with aluminium chloride. Vaseline and a sterile dressing were applied. The patient tolerated the procedure and no complications were noted. The patient was provided with verbal and written post care instructions.    2. Multiple benign nevi. Counseled on ABCDEs of melanoma and sun protection. Asked patient to return sooner if noticing changing or symptomatic lesions.    3. Symptomatic varicose veins.  - Vascular surgery referral placed.    Follow-up in 1 year, earlier for new or changing lesions.       Staff Involved:  Staff Only    Negin Zimmerman MD    Department of Dermatology  [facility]: Phone: [phone], Fax: [phone]

## 2020-03-18 NOTE — NURSING NOTE
Lidocaine-epinephrine 1-1:642191 % injection   1mL once for one use, starting 3/18/2020 ending 3/18/2020,  2mL disp, R-0, injection  Injected by Evette Villarreal CMA

## 2020-03-18 NOTE — PROGRESS NOTES
[facility] Dermatology Note      Dermatology Problem List:  # Neoplasm of uncertain behavior, central mid back, bx 3/18/2020     CC:   Chief Complaint   Patient presents with     Skin Check     Spot on back that is now back. Erum would like to do a full body skin check today.         Encounter Date: Mar 18, 2020    History of Present Illness:  Ms. Erum Díaz is a 44 year old female who presents for evaluation of lesion on back and skin check. First noted spot about 6 weeks ago. It was bloody. Since then it's maybe a little smaller but still there. She also has varicose veins that cause pain when walking and would like a referral. No other painful, bleeding, non-healing, or otherwise symptomatic lesions. Otherwise in usual state of health. No additional skin concerns. No personal or family history of skin cancer.     She had a lot of sun growing up in [place]. No tanning bed use.    Past Medical History:   Patient Active Problem List   Diagnosis     Fibromatosis, aggressive     Incomplete bladder emptying     Urgency incontinence     Urinary urgency     Past Medical History:   Diagnosis Date     Allergy      Asthma     exercise induced     Scoliosis      Past Surgical History:   Procedure Laterality Date     BIOPSY       COLONOSCOPY  1/24/2012    Procedure:COLONOSCOPY; Surgeon:LOREE BEJARANO; Location: GI     DILATION AND CURETTAGE, OPERATIVE HYSTEROSCOPY WITH MORCELLATOR, COMBINED N/A 3/1/2016    Procedure: COMBINED DILATION AND CURETTAGE, OPERATIVE HYSTEROSCOPY WITH MORCELLATOR;  Surgeon: Jade Alcazar MD;  Location:  OR       Social History:  Patient reports that she has never smoked. She has never used smokeless tobacco. She reports current alcohol use. She reports that she does not use drugs.   Works as a veterinary dermatologist.    Family History:  Family History   Problem Relation Age of Onset     Diabetes Father      Melanoma No family hx of      Skin Cancer No family hx  of        Medications:  Current Outpatient Medications   Medication Sig Dispense Refill     ALBUTEROL SULFATE HFA IN Inhale  into the lungs as needed.       Calcium Carbonate-Vitamin D (CALCIUM + D) 600-200 MG-UNIT per tablet Take 2 tablets by mouth daily.       FEXOFENADINE HCL PO Take  by mouth as needed.       ibuprofen (ADVIL,MOTRIN) 200 MG tablet Take 200 mg by mouth every 4 hours as needed. Takes 2 tablets PRN       mometasone (NASONEX) 50 MCG/ACT nasal spray Spray 2 sprays into both nostrils daily.       Multiple Vitamins-Minerals (MULTI FOR HER) TABS Take 1 tablet by mouth daily.       FLUoxetine HCl (PROZAC PO)        Allergies   Allergen Reactions     Hydrocodone Nausea and Vomiting     Sedated, dizzy     Minocycline      Dizziness         Review of Systems:  -Constitutional: Otherwise feeling well today, in usual state of health.  -Skin: As above in HPI. No additional skin concerns.    Physical exam:  GEN: This is a well developed, well-nourished female in no acute distress, in a pleasant mood.    SKIN: Full skin, which includes the head/face, both arms, chest, back, abdomen,both legs, genitalia and/or groin buttocks, digits and/or nails, was examined.  -Graff skin type: III  -Central mid back with 5 mm slightly pearly papule. Dermoscopy some flecks of pigment, small vessels.  -Multiple regular brown pigmented macules and papules are identified on the trunk and extremities.   -No other lesions of concern on areas examined.     Impression/Plan:  1. Neoplasm of uncertain behavior, central mid back. Ddx ISK, nevus, BCC.  - Shave biopsy:  After discussion of benefits and risks including but not limited to bleeding/bruising, pain/swelling, infection, scar, incomplete removal, nerve damage/numbness, recurrence, and non-diagnostic biopsy, written consent, verbal consent and photographs were obtained. Time-out was performed. The area was cleaned with isopropyl alcohol. 0.5ml of 1% lidocaine with 1:100,000  epinephrine was injected to obtain adequate anesthesia. A shave biopsy was performed. Hemostasis was achieved with aluminium chloride. Vaseline and a sterile dressing were applied. The patient tolerated the procedure and no complications were noted. The patient was provided with verbal and written post care instructions.    2. Multiple benign nevi. Counseled on ABCDEs of melanoma and sun protection. Asked patient to return sooner if noticing changing or symptomatic lesions.    3. Symptomatic varicose veins.  - Vascular surgery referral placed.    Follow-up in 1 year, earlier for new or changing lesions.       Staff Involved:  Staff Only    Negin Zimmerman MD    Department of Dermatology  [facility]: Phone: [phone], Fax: [phone]

## 2020-03-18 NOTE — NURSING NOTE
Dermatology Rooming Note    Erum Díaz's goals for this visit include:   Chief Complaint   Patient presents with     Skin Check     Spot on back that is now back. Erum would like to do a full body skin check today.     Evette Villarreal, CMA

## 2020-03-18 NOTE — PATIENT INSTRUCTIONS
Skin overall looks good today.  We will check spot on back.    Return in 1 year, sooner if concerns.    The ABCDEs of Melanoma  Asymmetry, Border (irregularity), Color (not uniform, changes in color), Diameter (greater than 6 mm which is about the size of a pencil eraser), and Evolving (any changes in preexisting moles)    Skin cancer can develop anywhere on the skin. Ask someone for help when checking your skin, especially in hard to see places. If you notice a mole different from others, or that changes, enlarges, itches, or bleeds (even if it is small), you should see a dermatologist.      Wound Care After a Biopsy    What is a skin biopsy?  A skin biopsy allows the doctor to examine a very small piece of tissue under the microscope to determine the diagnosis and the best treatment for the skin condition. A local anesthetic (numbing medicine)  is injected with a very small needle into the skin area to be tested. A small piece of skin is taken from the area. Sometimes a suture (stitch) is used.     What are the risks of a skin biopsy?  I will experience scar, bleeding, swelling, pain, crusting and redness. I may experience incomplete removal or recurrence. Risks of this procedure are excessive bleeding, bruising, infection, nerve damage, numbness, thick (hypertrophic or keloidal) scar and non-diagnostic biopsy.    How should I care for my wound for the first 24 hours?    Keep the wound dry and covered for 24 hours    If it bleeds, hold direct pressure on the area for 15 minutes. If bleeding does not stop then go to the emergency room    Avoid strenuous exercise the first 1-2 days or as your doctor instructs you    How should I care for the wound after 24 hours?    After 24 hours, remove the bandage    You may bathe or shower as normal    If you had a scalp biopsy, you can shampoo as usual and can use shower water to clean the biopsy site daily    Clean the wound twice a day with gentle soap and water    Do not  scrub, be gentle    Apply white petroleum/Vaseline after cleaning the wound with a cotton swab or a clean finger, and keep the site covered with a Bandaid /bandage. Bandages are not necessary with a scalp biopsy    If you are unable to cover the site with a Bandaid /bandage, re-apply ointment 2-3 times a day to keep the site moist. Moisture will help with healing    Avoid strenuous activity for first 1-2 days    Avoid lakes, rivers, pools, and oceans until the stitches are removed or the site is healed    How do I clean my wound?    Wash hands thoroughly with soap or use hand  before all wound care    Clean the wound with gentle soap and water    Apply white petroleum/Vaseline  to wound after it is clean    Replace the Bandaid /bandage to keep the wound covered for the first few days or as instructed by your doctor    If you had a scalp biopsy, warm shower water to the area on a daily basis should suffice    What should I use to clean my wound?     Cotton-tipped applicators (Qtips )    White petroleum jelly (Vaseline ). Use a clean new container and use Q-tips to apply.    Bandaids   as needed    Gentle soap     How should I care for my wound long term?    Do not get your wound dirty    Keep up with wound care for one week or until the area is healed.    A small scab will form and fall off by itself when the area is completely healed. The area will be red and will become pink in color as it heals. Sun protection is very important for how your scar will turn out. Sunscreen with an SPF 30 or greater is recommended once the area is healed.    If you have stitches, stitches need to be removed in 14 days. You may return to our clinic for this or you may have it done locally at your doctor s office.    You should have some soreness but it should be mild and slowly go away over several days. Talk to your doctor about using tylenol for pain,    When should I call my doctor?  If you have increased:     Pain or  swelling    Pus or drainage (clear or slightly yellow drainage is ok)    Temperature over 100F    Spreading redness or warmth around wound    When will I hear about my results?  The biopsy results can take 2-3 weeks to come back. The clinic will call you with the results, send you a [employer] message, or have you schedule a follow-up clinic or phone time to discuss the results. Contact our clinics if you do not hear from us in 3 weeks.     Who should I call with questions?    [facility]: [phone]     [facility]: [phone]    For urgent needs outside of business hours call the [facility] at [phone] and ask for the dermatology resident on call

## 2020-03-19 LAB — COPATH REPORT: NORMAL

## 2020-07-13 ENCOUNTER — ANCILLARY PROCEDURE (OUTPATIENT)
Dept: GENERAL RADIOLOGY | Facility: CLINIC | Age: 45
End: 2020-07-13
Attending: FAMILY MEDICINE
Payer: COMMERCIAL

## 2020-07-13 ENCOUNTER — OFFICE VISIT (OUTPATIENT)
Dept: ORTHOPEDICS | Facility: CLINIC | Age: 45
End: 2020-07-13
Payer: COMMERCIAL

## 2020-07-13 VITALS — BODY MASS INDEX: 27.29 KG/M2 | WEIGHT: 154 LBS | HEIGHT: 63 IN

## 2020-07-13 DIAGNOSIS — M25.572 ACUTE LEFT ANKLE PAIN: Primary | ICD-10-CM

## 2020-07-13 ASSESSMENT — MIFFLIN-ST. JEOR: SCORE: 1317.67

## 2020-07-13 NOTE — LETTER
Patient:  Erum Díaz  :   1975  MRN:     [id number]      2020    To whom it may concern:    Erum Díaz is under my professional care for a left ankle injury and will not be able to stand on the left leg for the next week.  Please excuse her from duties during this time.  She may return at that time if her pain is improved but will require the use of a walking boot for up to 6 weeks.      Please contact our office with questions or concerns.     Sincerely,        Dionte Ortega MD

## 2020-07-13 NOTE — Clinical Note
7/13/2020         RE: Erum Díaz  [address] [facility] [address]        Dear Colleague,    Thank you for referring your patient, Erum Díaz, to the [facility] SPORTS AND ORTHOPAEDIC WALK IN CLINIC. Please see a copy of my visit note below.          SPORTS & ORTHOPEDIC WALK-IN VISIT 7/13/2020    Primary Care Physician: Dr. Walker    Last night she was playing on a trampoline and rolled her ankle. She has a history of ankle sprains on left ankle. She is having pain on both the medial and lateral ankle. She is having difficulty and pain with weight bearing and walking. She has felt light headed as if she might pass out possibly due to pain.     Reason for visit:     What part of your body is injured / painful?  left ankle    What caused the injury /pain? Overuse injury from regular activity    How long ago did your injury occur or pain begin? yesterday    What are your most bothersome symptoms? Pain and Swelling    How would you characterize your symptom?  sharp and throbing    What makes your symptoms better? Ice, Ibuprofen and Wrap or brace    What makes your symptoms worse? Standing, Walking and Movement    Have you been previously seen for this problem? No    Medical History:    Any recent changes to your medical history? No    Any new medication prescribed since last visit? No    Have you had surgery on this body part before? No    Social History:    Occupation: Vet    Handedness: Right    Exercise: 3-4 days/week    Review of Systems:    Do you have fever, chills, weight loss? No    Do you have any vision problems? No    Do you have any chest pain or edema? No    Do you have any shortness of breath or wheezing?  No    Do you have stomach problems? No    Do you have any numbness or focal weakness? No    Do you have diabetes? No    Do you have problems with bleeding or clotting? No    Do you have an rashes or other skin lesions? No           Again, thank you for allowing me to participate  in the care of your patient.        Sincerely,        Dionte Ortega MD

## 2020-07-13 NOTE — PROGRESS NOTES
"      SPORTS & ORTHOPEDIC WALK-IN VISIT 7/13/2020    Primary Care Physician: Dr. Walker    Here today with left ankle pain.  Yesterday she was jumping on a trampoline and when jumping off landed with forceful inversion of her ankle.  Had immediate pain.  Has not been able to bear weight since that time.  Has increased swelling.  Most pain is medial.  Has had significant ankle sprains in the past but no fractures.  No tingling or numbness.  Has been icing and elevating which helps some.  Ibuprofen is also been helpful.    Reason for visit:     What part of your body is injured / painful?  left ankle    What caused the injury /pain? Overuse injury from regular activity    How long ago did your injury occur or pain begin? yesterday    What are your most bothersome symptoms? Pain and Swelling    How would you characterize your symptom?  sharp and throbing    What makes your symptoms better? Ice, Ibuprofen and Wrap or brace    What makes your symptoms worse? Standing, Walking and Movement    Have you been previously seen for this problem? No    Medical History:    Any recent changes to your medical history? No    Any new medication prescribed since last visit? No    Have you had surgery on this body part before? No    Social History:    Occupation: Vet    Handedness: Right    Exercise: 3-4 days/week    Review of Systems:    Do you have fever, chills, weight loss? No    Do you have any vision problems? No    Do you have any chest pain or edema? No    Do you have any shortness of breath or wheezing?  No    Do you have stomach problems? No    Do you have any numbness or focal weakness? No    Do you have diabetes? No    Do you have problems with bleeding or clotting? No    Do you have an rashes or other skin lesions? No         Past Medical History, Current Medications, and Allergies are reviewed in the electronic medical record as appropriate.       EXAM:Ht 1.6 m (5' 3\")   Wt 69.9 kg (154 lb)   BMI 27.28 kg/m  "     General: Alert, pleasant, no distress  Left ankle: Significant soft tissue swelling diffusely about the ankle including over the medial and lateral malleoli or areas.  Pain with passive range of motion in the medial ankle.  Is able to dorsiflex, plantarflex, invert and rod against resistance with discomfort but no significant weakness is noted.  She has significant tenderness to palpation over the medial aspect of the tibiotalar joint and deltoid ligament area, including tenderness directly over the medial malleolus.  There is also tenderness in the distribution of the ATFL and lateral malleolus.  No tenderness of the midfoot or with calcaneal compression.  Sensation is intact to light touch.    Imaging: 3 view xrays of left ankle performed and reviewed independently demonstrating lucency in the medial talar dome of undetermined acuity.  No other fracture seen. See EMR for formal radiology report.         Assessment: Patient is a 44 year old female with medial left ankle pain after forceful inversion injury yesterday no obvious fracture demonstrated on radiographs of the lucency seen in the talar dome likely represents osteochondral lesion.  Unclear if this is chronic or acute.    Recommendations:   Reviewed imaging and assessment the patient in detail  I recommended walking boot and minimal or toe-touch weightbearing.  She was provided crutches as well and instructed on their use.  We will have a low threshold to follow-up with MRI if her pain is not improving over the course of the next week.  Otherwise she will continue to elevate, ice, use OTC NSAIDs as necessary.  Given letter for work.    Dionte Ortega MD

## 2020-07-20 ENCOUNTER — MYC MEDICAL ADVICE (OUTPATIENT)
Dept: ORTHOPEDICS | Facility: CLINIC | Age: 45
End: 2020-07-20

## 2020-07-29 ENCOUNTER — MYC MEDICAL ADVICE (OUTPATIENT)
Dept: ORTHOPEDICS | Facility: CLINIC | Age: 45
End: 2020-07-29

## 2020-08-08 ENCOUNTER — VIRTUAL VISIT (OUTPATIENT)
Dept: ORTHOPEDICS | Facility: CLINIC | Age: 45
End: 2020-08-08
Payer: COMMERCIAL

## 2020-08-08 DIAGNOSIS — M25.572 ACUTE LEFT ANKLE PAIN: Primary | ICD-10-CM

## 2020-08-08 NOTE — PROGRESS NOTES
"Erum Díaz is a 44 year old female who is being evaluated via a billable telephone visit.      The patient has been notified of following:     \"This telephone visit will be conducted via a call between you and your physician/provider. We have found that certain health care needs can be provided without the need for a physical exam.  This service lets us provide the care you need with a short phone conversation.  If a prescription is necessary we can send it directly to your pharmacy.  If lab work is needed we can place an order for that and you can then stop by our lab to have the test done at a later time.    Telephone visits are billed at different rates depending on your insurance coverage. During this emergency period, for some insurers they may be billed the same as an in-person visit.  Please reach out to your insurance provider with any questions.    If during the course of the call the physician/provider feels a telephone visit is not appropriate, you will not be charged for this service.\"    Patient has given verbal consent for Telephone visit?  Yes    What phone number would you like to be contacted at? [phone]    How would you like to obtain your AVS? MyChart     Phone visit conducted with Erum regarding left ankle pain and to review MRI results.   Has continued to walk in boot since our last visit. Trying to stay off of her foot as much as possible. Is able to walk and stand for periods of time without pain while in boot. Still has occasional pain in medial ankle. Some persistent swelling there as well.     Reviewed MRI done at [facility]. Per Radiology report:   1. 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 Small 2 mm segment of high-grade chondral loss along the anterior margin of this osteochondral lesion.    2. Increased marrow edema involving the lateral calcaneus with mild faint marrow edema " involving the lateral talus may reflect osseous contusions status post reported recent injury. No osseous fracture site is identified.    3. Sequelae of prior sprain injuries involving the anterior talofibular and calcaneofibular ligaments. Also suspect sequelae of prior sprain injuries involving the deltoid ligament.      Reviewed results with the patient. Discussed that osteochondral lesion is likely old. Suspect most sx are coming from calcaneal contusion. She can continue to walk in boot as tolerated. Ok to transition out of boot when able. Recommended gentle ankle ROM. She will contact us if she would like formal PT referral.     Dionte Ortega MD    Phone call duration: 22 minutes    Dionte Ortega MD

## 2020-09-14 ENCOUNTER — MYC MEDICAL ADVICE (OUTPATIENT)
Dept: ORTHOPEDICS | Facility: CLINIC | Age: 45
End: 2020-09-14

## 2020-09-14 DIAGNOSIS — M25.572 ACUTE LEFT ANKLE PAIN: Primary | ICD-10-CM

## 2020-11-29 ENCOUNTER — HEALTH MAINTENANCE LETTER (OUTPATIENT)
Age: 45
End: 2020-11-29

## 2021-02-14 ENCOUNTER — HEALTH MAINTENANCE LETTER (OUTPATIENT)
Age: 46
End: 2021-02-14

## 2021-06-02 ENCOUNTER — RECORDS - HEALTHEAST (OUTPATIENT)
Dept: ADMINISTRATIVE | Facility: CLINIC | Age: 46
End: 2021-06-02

## 2021-08-04 ENCOUNTER — TRANSFERRED RECORDS (OUTPATIENT)
Dept: HEALTH INFORMATION MANAGEMENT | Facility: CLINIC | Age: 46
End: 2021-08-04

## 2021-08-05 ENCOUNTER — TRANSFERRED RECORDS (OUTPATIENT)
Dept: HEALTH INFORMATION MANAGEMENT | Facility: CLINIC | Age: 46
End: 2021-08-05

## 2021-08-05 ENCOUNTER — MEDICAL CORRESPONDENCE (OUTPATIENT)
Dept: HEALTH INFORMATION MANAGEMENT | Facility: CLINIC | Age: 46
End: 2021-08-05

## 2021-08-05 ENCOUNTER — ANCILLARY PROCEDURE (OUTPATIENT)
Dept: ULTRASOUND IMAGING | Facility: CLINIC | Age: 46
End: 2021-08-05
Attending: FAMILY MEDICINE
Payer: COMMERCIAL

## 2021-08-05 DIAGNOSIS — R59.0 SUPRACLAVICULAR LYMPHADENOPATHY: ICD-10-CM

## 2021-08-05 PROCEDURE — 76536 US EXAM OF HEAD AND NECK: CPT | Mod: GC | Performed by: RADIOLOGY

## 2021-08-10 ENCOUNTER — TELEPHONE (OUTPATIENT)
Dept: OTOLARYNGOLOGY | Facility: CLINIC | Age: 46
End: 2021-08-10
Payer: COMMERCIAL

## 2021-08-10 NOTE — TELEPHONE ENCOUNTER
[facility] Call Center    Phone Message    May a detailed message be left on voicemail: yes     Reason for Call: Appointment Intake    Referring Provider Name: Dr. Ashwini Hays at [facility]  Diagnosis and/or Symptoms: Supraclavicular lymphadenopathy     Action Taken: Message routed to:  [facility] ([facility]): [facility] [[id number]] - per protocols    Travel Screening: Not Applicable

## 2021-08-11 ENCOUNTER — TELEPHONE (OUTPATIENT)
Dept: OTOLARYNGOLOGY | Facility: CLINIC | Age: 46
End: 2021-08-11

## 2021-08-11 ENCOUNTER — TRANSCRIBE ORDERS (OUTPATIENT)
Dept: OTHER | Age: 46
End: 2021-08-11

## 2021-08-11 DIAGNOSIS — R59.0 SUPRACLAVICULAR LYMPHADENOPATHY: Primary | ICD-10-CM

## 2021-08-11 NOTE — TELEPHONE ENCOUNTER
LVM to schedule with next available head and neck (carey glover, gene)    Visit type: UMP NEW    Appt notes: Supraclavicular lymphadenopathy  - Referred by Dr. Ashwini Hays at [facility] call center number

## 2021-08-12 NOTE — TELEPHONE ENCOUNTER
FUTURE VISIT INFORMATION      FUTURE VISIT INFORMATION:    Date: 8/16/2021    Time: 8AM    Location: [facility]  REFERRAL INFORMATION:    Referring provider: Dr. Ashwini Hays    Referring providers clinic:  [place]     Reason for visit/diagnosis  Supraclavicular lymphadenopathy  - Referred by Dr. Ashwini Hays at [facility]. Ok to schedule, per chart notes.    RECORDS REQUESTED FROM:       Clinic name Comments Records Status Imaging Status   [place]  8/5/2201 note and referral  Scanned in Epic    Imaging 8/5/2021 US Head Neck  [facility] PACS

## 2021-08-16 ENCOUNTER — PRE VISIT (OUTPATIENT)
Dept: OTOLARYNGOLOGY | Facility: CLINIC | Age: 46
End: 2021-08-16

## 2021-08-16 ENCOUNTER — OFFICE VISIT (OUTPATIENT)
Dept: OTOLARYNGOLOGY | Facility: CLINIC | Age: 46
End: 2021-08-16
Payer: COMMERCIAL

## 2021-08-16 VITALS
SYSTOLIC BLOOD PRESSURE: 106 MMHG | BODY MASS INDEX: 28.91 KG/M2 | TEMPERATURE: 97.7 F | HEIGHT: 63 IN | DIASTOLIC BLOOD PRESSURE: 67 MMHG | OXYGEN SATURATION: 99 % | HEART RATE: 82 BPM | WEIGHT: 163.14 LBS

## 2021-08-16 DIAGNOSIS — R59.1 LYMPHADENOPATHY: ICD-10-CM

## 2021-08-16 DIAGNOSIS — D48.119 FIBROMATOSIS, AGGRESSIVE: Primary | ICD-10-CM

## 2021-08-16 PROCEDURE — 31575 DIAGNOSTIC LARYNGOSCOPY: CPT | Performed by: STUDENT IN AN ORGANIZED HEALTH CARE EDUCATION/TRAINING PROGRAM

## 2021-08-16 PROCEDURE — 99204 OFFICE O/P NEW MOD 45 MIN: CPT | Mod: 25 | Performed by: STUDENT IN AN ORGANIZED HEALTH CARE EDUCATION/TRAINING PROGRAM

## 2021-08-16 ASSESSMENT — PAIN SCALES - GENERAL: PAINLEVEL: NO PAIN (0)

## 2021-08-16 ASSESSMENT — MIFFLIN-ST. JEOR: SCORE: 1354.13

## 2021-08-16 NOTE — PROGRESS NOTES
August 16, 2021      Ashwini Hays M.D.     [address]  [address]      Dear Dr. Hays,    I had the pleasure of meeting Ms. Díaz today in clinic.    HISTORY OF PRESENT ILLNESS:  As you know, she is a pleasant 45-year-old woman you referred for evaluation of left supraclavicular lymphadenopathy.  The patient reports it has been present since around March after she had her COVID vaccination.  She has not noticed any fluctuation in the size.  She has no head and neck symptoms.  Specifically, no dysphagia, odynophagia or respiratory changes.  She intermittently has noticed a lymph node on the right side as well, but has not noticed any other neck masses.  She has a history of a fibromatosis of the left rectus muscle.  She has had an ultrasound of the neck performed, which demonstrated a left level IV 0.6 x 0.5 x 0.7 cm rounded lymph node with a preserved fatty hilum and a left level V 1.2 x 0.5 x 0.7 cm node with normal morphology and fatty hilum.    PAST MEDICAL HISTORY:  Fibromatosis of the left rectus muscle.    PAST SURGICAL HISTORY:    1.  Uterine fibroids biopsy.  2.  Colonoscopy.  3.  Dilatation and curettage    MEDICATIONS:    1.  Albuterol.  2.  Calcium and Vitamin D.    ALLERGIES:   1.  Hydrocodone.  2.  Minocycline.    SOCIAL HISTORY:  She is a nonsmoker,and drinks alcohol socially.    FAMILY HISTORY:  Diabetes.    REVIEW OF SYSTEMS:  A 10-point review of system was performed, negative aside from HPI.    PHYSICAL EXAMINATION:  She is alert, in no acute distress.  No lesions are seen in the oral cavity or oropharynx.  She has a small, approximately 1 cm less mobile lymph node in the left supraclavicular region that is mobile over the clavicle.  No other lymphadenopathy is appreciated.  She has a benign-appearing skin lesion in the posterior upper neck.  No other neck masses or skin lesions were seen.    PROCEDURE:  Fiberoptic laryngoscopy was  performed after the nose was topically decongested and anesthetized.  The scope was advanced in the nasal cavity.  The nasopharynx was clear.  The base of tongue and vallecula was clear.  The epiglottis was sharp without lesions.  The vocal cords are mobile bilaterally without lesions.  The piriform sinuses were clear.  I performed an ultrasound today in the clinic that demonstrated an approximately 0.6 cm lymph node in the supraclavicular region.  I could not clearly see the 1.2 cm node that was reported in the prior ultrasound.  I did review the previous ultrasound from radiology and these lymph nodes appeared benign.    ASSESSMENT AND PLAN:  A 45-year-old woman presenting with a palpable node in the left supraclavicular region after COVID vaccination.  Her physical exam appears consistent with a benign lymph node.  An ultrasound examination supports this as well.  I do not feel strongly about any pathologic evaluation of the lymph node at this time.  I recommended continued observation.  If the lymph node persists or gets larger, then we could consider performing a fine needle aspirate.  It is easily palpable.  She will keep observing this and will let us know if she notices any changes.    Thank you for allowing me to participate in the care of this patient. If you have any further questions, please do not hesitate to contact me.     Sincerely,      Oz Francis M.D.      Otolaryngology-Head & Neck Surgery   [facility]     45 minutes spent on the date of the encounter in chart review, patient visit, review of tests, documentation and/or discussion with other providers about the issues documented above.

## 2021-08-16 NOTE — PATIENT INSTRUCTIONS
1. Please follow-up in clinic as needed.   2. Please call the ENT clinic with any questions,concerns, new or worsening symptoms.    -Clinic number is [phone]   - Lisa's direct line (Dr. Francis's nurse) [phone]

## 2021-08-16 NOTE — NURSING NOTE
"Chief Complaint   Patient presents with     Consult     supraclavicular lymphadenopathy      Blood pressure 106/67, pulse 82, temperature 97.7  F (36.5  C), height 1.6 m (5' 3\"), weight 74 kg (163 lb 2.3 oz), SpO2 99 %, not currently breastfeeding.    Tyrone Jung LPN    "

## 2021-08-16 NOTE — LETTER
8/16/2021       RE: Erum Díaz  [address]  [facility] [address]     Dear Colleague,    Thank you for referring your patient, Erum Díaz, to the [facility] [place] [facility]. Please see a copy of my visit note below.    August 16, 2021      Ashwini Hays M.D.     [place] [address]      Dear Dr. Hays,    I had the pleasure of meeting Ms. Díaz today in clinic.    HISTORY OF PRESENT ILLNESS:  As you know, she is a pleasant 45-year-old woman you referred for evaluation of left supraclavicular lymphadenopathy.  The patient reports it has been present since around March after she had her COVID vaccination.  She has not noticed any fluctuation in the size.  She has no head and neck symptoms.  Specifically, no dysphagia, odynophagia or respiratory changes.  She intermittently has noticed a lymph node on the right side as well, but has not noticed any other neck masses.  She has a history of a fibromatosis of the left rectus muscle.  She has had an ultrasound of the neck performed, which demonstrated a left level IV 0.6 x 0.5 x 0.7 cm rounded lymph node with a preserved fatty hilum and a left level V 1.2 x 0.5 x 0.7 cm node with normal morphology and fatty hilum.    PAST MEDICAL HISTORY:  Fibromatosis of the left rectus muscle.    PAST SURGICAL HISTORY:    1.  Uterine fibroids biopsy.  2.  Colonoscopy.  3.  Dilatation and curettage    MEDICATIONS:    1.  Albuterol.  2.  Calcium and Vitamin D.    ALLERGIES:   1.  Hydrocodone.  2.  Minocycline.    SOCIAL HISTORY:  She is a nonsmoker,and drinks alcohol socially.    FAMILY HISTORY:  Diabetes.    REVIEW OF SYSTEMS:  A 10-point review of system was performed, negative aside from HPI.    PHYSICAL EXAMINATION:  She is alert, in no acute distress.  No lesions are seen in the oral cavity or oropharynx.  She  has a small, approximately 1 cm less mobile lymph node in the left supraclavicular region that is mobile over the clavicle.  No other lymphadenopathy is appreciated.  She has a benign-appearing skin lesion in the posterior upper neck.  No other neck masses or skin lesions were seen.    PROCEDURE:  Fiberoptic laryngoscopy was performed after the nose was topically decongested and anesthetized.  The scope was advanced in the nasal cavity.  The nasopharynx was clear.  The base of tongue and vallecula was clear.  The epiglottis was sharp without lesions.  The vocal cords are mobile bilaterally without lesions.  The piriform sinuses were clear.  I performed an ultrasound today in the clinic that demonstrated an approximately 0.6 cm lymph node in the supraclavicular region.  I could not clearly see the 1.2 cm node that was reported in the prior ultrasound.  I did review the previous ultrasound from radiology and these lymph nodes appeared benign.    ASSESSMENT AND PLAN:  A 45-year-old woman presenting with a palpable node in the left supraclavicular region after COVID vaccination.  Her physical exam appears consistent with a benign lymph node.  An ultrasound examination supports this as well.  I do not feel strongly about any pathologic evaluation of the lymph node at this time.  I recommended continued observation.  If the lymph node persists or gets larger, then we could consider performing a fine needle aspirate.  It is easily palpable.  She will keep observing this and will let us know if she notices any changes.    Thank you for allowing me to participate in the care of this patient. If you have any further questions, please do not hesitate to contact me.     Sincerely,      Oz Francis M.D.      Otolaryngology-Head & Neck Surgery   [facility]     45 minutes spent on the date of the encounter in chart review, patient visit, review of tests, documentation and/or discussion with other  providers about the issues documented above.         Again, thank you for allowing me to participate in the care of your patient.      Sincerely,    Oz Francis MD

## 2021-09-09 ENCOUNTER — TRANSFERRED RECORDS (OUTPATIENT)
Dept: HEALTH INFORMATION MANAGEMENT | Facility: CLINIC | Age: 46
End: 2021-09-09

## 2021-09-25 ENCOUNTER — HEALTH MAINTENANCE LETTER (OUTPATIENT)
Age: 46
End: 2021-09-25

## 2021-10-01 ENCOUNTER — TRANSFERRED RECORDS (OUTPATIENT)
Dept: HEALTH INFORMATION MANAGEMENT | Facility: CLINIC | Age: 46
End: 2021-10-01

## 2021-12-20 ENCOUNTER — TRANSFERRED RECORDS (OUTPATIENT)
Dept: HEALTH INFORMATION MANAGEMENT | Facility: CLINIC | Age: 46
End: 2021-12-20
Payer: COMMERCIAL

## 2022-01-15 ENCOUNTER — HEALTH MAINTENANCE LETTER (OUTPATIENT)
Age: 47
End: 2022-01-15

## 2022-03-06 ENCOUNTER — HEALTH MAINTENANCE LETTER (OUTPATIENT)
Age: 47
End: 2022-03-06

## 2022-04-12 ENCOUNTER — TRANSFERRED RECORDS (OUTPATIENT)
Dept: HEALTH INFORMATION MANAGEMENT | Facility: CLINIC | Age: 47
End: 2022-04-12
Payer: COMMERCIAL

## 2022-05-05 ENCOUNTER — TRANSFERRED RECORDS (OUTPATIENT)
Dept: HEALTH INFORMATION MANAGEMENT | Facility: CLINIC | Age: 47
End: 2022-05-05
Payer: COMMERCIAL

## 2022-06-02 ENCOUNTER — TRANSFERRED RECORDS (OUTPATIENT)
Dept: HEALTH INFORMATION MANAGEMENT | Facility: CLINIC | Age: 47
End: 2022-06-02
Payer: COMMERCIAL

## 2022-08-30 ENCOUNTER — TRANSFERRED RECORDS (OUTPATIENT)
Dept: HEALTH INFORMATION MANAGEMENT | Facility: CLINIC | Age: 47
End: 2022-08-30

## 2022-12-26 ENCOUNTER — HEALTH MAINTENANCE LETTER (OUTPATIENT)
Age: 47
End: 2022-12-26

## 2023-04-16 ENCOUNTER — HEALTH MAINTENANCE LETTER (OUTPATIENT)
Age: 48
End: 2023-04-16

## 2024-01-05 ENCOUNTER — TRANSCRIBE ORDERS (OUTPATIENT)
Dept: OTHER | Age: 49
End: 2024-01-05

## 2024-01-05 DIAGNOSIS — I83.813 VARICOSE VEINS OF BOTH LOWER EXTREMITIES WITH PAIN: Primary | ICD-10-CM

## 2024-01-09 ENCOUNTER — TELEPHONE (OUTPATIENT)
Dept: VASCULAR SURGERY | Facility: CLINIC | Age: 49
End: 2024-01-09
Payer: COMMERCIAL

## 2024-01-09 DIAGNOSIS — I83.813 VARICOSE VEINS OF BOTH LOWER EXTREMITIES WITH PAIN: Primary | ICD-10-CM

## 2024-01-09 NOTE — TELEPHONE ENCOUNTER
Called and LVM for Pt requesting a return call to clinic to discuss referral. Clinic telephone provided.       Skye Mckeon LPN

## 2024-01-10 NOTE — TELEPHONE ENCOUNTER
Pt returned call to clinic to triage varicose veins of both lower extremities with pain  to determine appropriate scheduling and necessity for imaging.     Dx: Varicose veins of both lower extremities with pain   Referring provider: Zayra Witt MD      Imaging:  None.     SXS:    Do you see bulging veins? If YES, where and how long have they been present? No bulging veins; however, she noted she does have prominent veins on both legs on the medial aspect of her knees and slightly extending down.    Any sxs associated with bulging veins? YES - Pt reports that her feet get hot and she experiences leg fatigue with longer periods of standing and with exercise.    Any LE edema? No    If YES to either of the above sxs, is there anything that improves or worsens sxs? No    Any LE skin discoloration? YES - Pt notes the areas of prominent veins bruise easily when bumped. More so than other areas of her legs/body.    Any present/hx of slow healing LE wounds? No    Any LE hair loss? No    Hx of DVT or other episodes of clotting? No    Hx surgery/trama to legs or pelvis? No    FmHx varicose veins or veins issues? YES - Pt noted her mother has very prominent varicose veins. Her father also has them, but not as severe.    Any prior vein evaluations, treatments or imaging? If YES, where and when was this completed? No    Current compression stocking use? No. Previously attempted in the past. OTC. She does not recall if they helped or if they did not.           Noted the above would be discussed with clinic team and Pt would be contacted with scheduling and imaging recommendations.  Pt verbalized understanding, agreed to current plan and denied any further questions.     Skye Mckeon LPN

## 2024-01-10 NOTE — TELEPHONE ENCOUNTER
Date: 1/10/2024    Time of Call: 3:59 PM     Diagnosis:  Varicose veins of both lower extremities with pain      [ VORB ] Ordering provider: Dr Jovanni Kapoor  Order: US Venous Competency, Bilateral     Order received by: Skye Mckeon LPN     Follow-up/additional notes:

## 2024-01-11 ENCOUNTER — TELEPHONE (OUTPATIENT)
Dept: INTERVENTIONAL RADIOLOGY/VASCULAR | Facility: CLINIC | Age: 49
End: 2024-01-11
Payer: COMMERCIAL

## 2024-02-13 ENCOUNTER — TELEPHONE (OUTPATIENT)
Dept: VASCULAR SURGERY | Facility: CLINIC | Age: 49
End: 2024-02-13
Payer: COMMERCIAL

## 2024-02-15 ENCOUNTER — TELEPHONE (OUTPATIENT)
Dept: VASCULAR SURGERY | Facility: CLINIC | Age: 49
End: 2024-02-15
Payer: COMMERCIAL

## 2024-03-28 ENCOUNTER — ANCILLARY PROCEDURE (OUTPATIENT)
Dept: ULTRASOUND IMAGING | Facility: CLINIC | Age: 49
End: 2024-03-28
Attending: RADIOLOGY
Payer: COMMERCIAL

## 2024-03-28 ENCOUNTER — OFFICE VISIT (OUTPATIENT)
Dept: VASCULAR SURGERY | Facility: CLINIC | Age: 49
End: 2024-03-28
Payer: COMMERCIAL

## 2024-03-28 VITALS — SYSTOLIC BLOOD PRESSURE: 118 MMHG | OXYGEN SATURATION: 97 % | DIASTOLIC BLOOD PRESSURE: 73 MMHG | HEART RATE: 70 BPM

## 2024-03-28 DIAGNOSIS — I83.813 VARICOSE VEINS OF BOTH LOWER EXTREMITIES WITH PAIN: ICD-10-CM

## 2024-03-28 DIAGNOSIS — I83.813 VARICOSE VEINS OF BOTH LOWER EXTREMITIES WITH PAIN: Primary | ICD-10-CM

## 2024-03-28 PROCEDURE — 93970 EXTREMITY STUDY: CPT | Performed by: RADIOLOGY

## 2024-03-28 PROCEDURE — 99203 OFFICE O/P NEW LOW 30 MIN: CPT | Performed by: PHYSICIAN ASSISTANT

## 2024-03-28 RX ORDER — GABAPENTIN 300 MG/1
CAPSULE ORAL
COMMUNITY
Start: 2024-03-25

## 2024-03-28 NOTE — PATIENT INSTRUCTIONS
INTERVENTIONAL RADIOLOGY CLINIC AFTER VISIT SUMMARY:  Dear Erum Díaz,    Thank you for choosing the [facility]. I would like to highlight some of the important information from our visit.     You have been prescribed compression stockings (20-30 mmHg knee high/thigh high stockings) that are medical grade. Please go to [facility] and [address] to get fitted appropriately for your stockings.   Wear your compression stockings during the day to help with symptoms. You do not need to wear the compression stockings at night.   Please work on walking at least 20 minutes per day.   Elevate your legs for at least an hour each day  Ongoing symptoms warrant return visit with Interventional Radiologist to discuss treatment options. Vascular referral with either Dr. Tillman or Dr. Kapoor in 3 months has been sent. IR referral [facility] in [place]. Return to vascular clinic in 3 months for follow-up to discuss treatment options.   I will follow up with your regarding the cosmetic options for your varicose veins    Please do not hesitate to contact our department if you have any further questions or concerns.     Sincerely,    Arlene Villegas PA-C    [facility]  [address] [place] [address], [address]  [phone]

## 2024-03-28 NOTE — PROGRESS NOTES
"    INTERVENTIONAL RADIOLOGY CLINIC NOTE    Dr. 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 Patient is referred by Dr. Zayra Witt. Patient presents for initial evaluation for varicose veins.     Briefly, patient has had varicosities for greater than 5 years.  Díaz reports having increased bruising at the site of the varcosities and pain after getting bumped on the varicosities.  She reports that if she's exercising or walking, her feet become \"hot.\" She gets leg fatigue/tiredness in bilateral lower extremities with walking, exercising, or standing long periods of time. She would rate her discomfort 5/10 on a scale of 1 to 10, pain can be intermittent and not present at rest. She has a lot of skin dryness on her lower extremities, she has to put a lot of lotion on her legs. She also reports muscle cramping in her calves, but this is rare. Her symtpoms have gradually been getting worse with age. Lakisha has prominent veins on both legs on the medial aspect of her knees extending inferiorly. Previously used OTC compression stockings, but not prescription grade compression stockings. She hasn't consistently wore compression stockings.  Denies LE edema, poor wound healing, or hair loss. No hx of trauma or DVT/clots. Ms. Díaz is interested in treatment and wants to find ways to prevent her varicosities from getting worse.     Mother has PMH of very prominent varicose veins. Her father, aunt, and brother all have varicose veins. 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  She has one 14 year old child, and reported significant swelling with pregnancy.     PAST MEDICAL HISTORY:  Past Medical History:   Diagnosis Date    Allergic rhinitis 15 years ago    chronic    Allergy     Asthma     exercise induced    Benign positional vertigo 2 " episodes march 2020 and 2021 spells    not currently, not sure if true vertigo    Blood clotting disorder (H24) 11 years ago    low platelets before/during birth, pregnancy related    Bone disease July 2020    bone bruise left foot, traumatic injury    Cancer (H) about 8-9 years ago    fibromatosis, left side abdominal muscle - had resolved    Gastroesophageal reflux disease many years ago    only noticed after too much ceffeine ingestion    Migraines occasional    - seems to be related to PMS    Reduced vision 10 years ago    wear glasses    Scoliosis     Skin disease early August    skin nodule on left side of the neck        PAST SURGICAL HISTORY:  Past Surgical History:   Procedure Laterality Date    BIOPSY      COLONOSCOPY  1/24/2012    Procedure:COLONOSCOPY; Surgeon:LOREE BEJARANO; Location: GI    DILATION AND CURETTAGE, OPERATIVE HYSTEROSCOPY WITH MORCELLATOR, COMBINED N/A 3/1/2016    Procedure: COMBINED DILATION AND CURETTAGE, OPERATIVE HYSTEROSCOPY WITH MORCELLATOR;  Surgeon: Jade Alcazar MD;  Location: [facility]       PAST SOCIAL HISTORY:  Patient is a non-smoker, she drinks EtOH socially.     MEDICATIONS:  Current Outpatient Medications   Medication    FLUoxetine (PROZAC) 20 MG capsule    gabapentin (NEURONTIN) 300 MG capsule    ALBUTEROL SULFATE HFA IN    Calcium Carbonate-Vitamin D (CALCIUM + D) 600-200 MG-UNIT per tablet    ibuprofen (ADVIL,MOTRIN) 200 MG tablet    Multiple Vitamins-Minerals (MULTI FOR HER) TABS     No current facility-administered medications for this visit.       ALLERGIES:     Allergies   Allergen Reactions    Hydrocodone Nausea and Vomiting     Sedated, dizzy    Minocycline      Dizziness       PHYSICAL EXAMINATION:  /73 (BP Location: Left arm, Patient Position: Sitting, Cuff Size: Adult Regular)   Pulse 70   SpO2 97%     General: AAOx3. Pleasant in NAD.   HEENT: NC/AT  LUNGS: Normal respirations.  ABDOMEN: Soft, non-tender, non-distended.  LEGS:  Pulses +2 bilat.  Bilateral reticular veins noted. Some 1-2cm areas of varicosities noted. Telangectasias bilaterally.     LABS/IMAGING:  Lab Results   Component Value Date    WBC 5.5 2016     Lab Results   Component Value Date    HGB 14.3 2016     Lab Results   Component Value Date     2016     INR   Date Value Ref Range Status   10/13/2011 1.00 0.86 - 1.14 Final        Imaging Reviewed:  US VENOUS COMPETENCY BILAT 3/28/24:  1. RIGHT LEG:       A. No superficial or deep venous thrombosis demonstrated.       B. Great saphenous vein incompetent from the proximal thigh  through proximal calf.       C. Incompetent varicose vein from the great saphenous vein in the  knee measures 2.5 mm in diameter.       D. No incompetent  vein demonstrated.     2. LEFT LEG:       A. No superficial or deep venous thrombosis demonstrated.       B. No superficial or deep venous incompetency demonstrated.       C. No incompetent  or varicose vein demonstrated.                    IMPRESSION/REPORT/PLAN:  Summary: 48 year old female with no pertinent PMH who presents for varicose vein evaluation. Patient is referred by Dr. Zayra Witt.     #Varicose vein: On the right, pt has evidence of GSV incompetence to prox thigh through prox calf. Incompetent varicose vein from GSV in knee with 2.5 mm in diameter. Pt has had worsening sx for the last 5 years. She has significant leg fatigue/discomfort that she rates a 5/10 on a scale of 1 to 10 that increased with walking, exercising or standing long periods of time. Risk factors include: FMHx, Female sex, , and prolonged standing.    PLAN:  -Compression stockings for 3 months.  Rx for medical grade compression stockings (20-30 mmhg) sent with patient.  -Recommend skin care, leg elevation, and exercise.  -Elevate legs at least 1 hour/day. Walking should be attempted for greater than 20 minutes per day.  -Discussed with patient that IR intervention would help with the leg  symptoms and not help the appearance of varicose veins.   -Ongoing symptoms warrant return visit with Interventional Radiologist to discuss treatment options. Vascular referral with either Dr. Tillman or Dr. Kapoor in 3 months has been sent.   -I discussed patient's case with Dr. Tillman. Plan will be for routine compression stocking use, then evaluation in [facility].   -We will establish a plan for cosmetic veins once she has treatment of her varicose veins.  -All of the patient's questions were answered to their satisfaction.    Arlene Villegas PA-C   Physician Assistant - Certified  Interventional Radiology  [phone] (IR control desk)      =====    CC: Dr. Zayra Witt.

## 2024-05-30 ENCOUNTER — TELEPHONE (OUTPATIENT)
Dept: VASCULAR SURGERY | Facility: CLINIC | Age: 49
End: 2024-05-30
Payer: COMMERCIAL

## 2024-06-23 ENCOUNTER — HEALTH MAINTENANCE LETTER (OUTPATIENT)
Age: 49
End: 2024-06-23

## 2024-10-04 ENCOUNTER — TRANSFERRED RECORDS (OUTPATIENT)
Dept: HEALTH INFORMATION MANAGEMENT | Facility: CLINIC | Age: 49
End: 2024-10-04
Payer: COMMERCIAL

## 2024-10-05 ENCOUNTER — MEDICAL CORRESPONDENCE (OUTPATIENT)
Dept: HEALTH INFORMATION MANAGEMENT | Facility: CLINIC | Age: 49
End: 2024-10-05
Payer: COMMERCIAL

## 2025-04-19 ENCOUNTER — HEALTH MAINTENANCE LETTER (OUTPATIENT)
Age: 50
End: 2025-04-19

## 2025-07-12 ENCOUNTER — HEALTH MAINTENANCE LETTER (OUTPATIENT)
Age: 50
End: 2025-07-12

## 2025-08-25 ENCOUNTER — MEDICAL CORRESPONDENCE (OUTPATIENT)
Dept: HEALTH INFORMATION MANAGEMENT | Facility: CLINIC | Age: 50
End: 2025-08-25
Payer: COMMERCIAL

## 2025-08-26 ENCOUNTER — TRANSCRIBE ORDERS (OUTPATIENT)
Dept: OTHER | Age: 50
End: 2025-08-26

## 2025-08-26 DIAGNOSIS — Z12.11 SCREENING FOR COLON CANCER: Primary | ICD-10-CM
